# Patient Record
Sex: MALE | Race: BLACK OR AFRICAN AMERICAN | NOT HISPANIC OR LATINO | Employment: UNEMPLOYED | URBAN - METROPOLITAN AREA
[De-identification: names, ages, dates, MRNs, and addresses within clinical notes are randomized per-mention and may not be internally consistent; named-entity substitution may affect disease eponyms.]

---

## 2020-03-03 ENCOUNTER — HOSPITAL ENCOUNTER (EMERGENCY)
Facility: HOSPITAL | Age: 25
Discharge: HOME | End: 2020-03-03
Attending: EMERGENCY MEDICINE | Admitting: EMERGENCY MEDICINE

## 2020-03-03 VITALS
SYSTOLIC BLOOD PRESSURE: 142 MMHG | BODY MASS INDEX: 30.61 KG/M2 | HEIGHT: 67 IN | HEART RATE: 91 BPM | DIASTOLIC BLOOD PRESSURE: 86 MMHG | TEMPERATURE: 97.7 F | WEIGHT: 195 LBS | OXYGEN SATURATION: 98 % | RESPIRATION RATE: 16 BRPM

## 2020-03-03 DIAGNOSIS — T78.1XXA ALLERGIC REACTION TO FOOD, INITIAL ENCOUNTER: Primary | ICD-10-CM

## 2020-03-03 PROCEDURE — 96374 THER/PROPH/DIAG INJ IV PUSH: CPT

## 2020-03-03 PROCEDURE — 25010000002 DIPHENHYDRAMINE PER 50 MG: Performed by: EMERGENCY MEDICINE

## 2020-03-03 PROCEDURE — 96375 TX/PRO/DX INJ NEW DRUG ADDON: CPT

## 2020-03-03 PROCEDURE — 99283 EMERGENCY DEPT VISIT LOW MDM: CPT

## 2020-03-03 PROCEDURE — 25010000002 METHYLPREDNISOLONE PER 125 MG: Performed by: EMERGENCY MEDICINE

## 2020-03-03 PROCEDURE — 25010000002 EPINEPHRINE PER 0.1 MG: Performed by: EMERGENCY MEDICINE

## 2020-03-03 PROCEDURE — 96372 THER/PROPH/DIAG INJ SC/IM: CPT

## 2020-03-03 RX ORDER — PREDNISONE 20 MG/1
60 TABLET ORAL DAILY
Qty: 9 TABLET | Refills: 0 | Status: SHIPPED | OUTPATIENT
Start: 2020-03-03

## 2020-03-03 RX ORDER — FAMOTIDINE 20 MG/1
20 TABLET, FILM COATED ORAL 2 TIMES DAILY
Qty: 10 TABLET | Refills: 0 | Status: SHIPPED | OUTPATIENT
Start: 2020-03-03

## 2020-03-03 RX ORDER — METHYLPREDNISOLONE SODIUM SUCCINATE 125 MG/2ML
125 INJECTION, POWDER, LYOPHILIZED, FOR SOLUTION INTRAMUSCULAR; INTRAVENOUS ONCE
Status: COMPLETED | OUTPATIENT
Start: 2020-03-03 | End: 2020-03-03

## 2020-03-03 RX ORDER — DIPHENHYDRAMINE HYDROCHLORIDE 50 MG/ML
12.5 INJECTION INTRAMUSCULAR; INTRAVENOUS ONCE
Status: COMPLETED | OUTPATIENT
Start: 2020-03-03 | End: 2020-03-03

## 2020-03-03 RX ORDER — FAMOTIDINE 10 MG/ML
20 INJECTION, SOLUTION INTRAVENOUS ONCE
Status: COMPLETED | OUTPATIENT
Start: 2020-03-03 | End: 2020-03-03

## 2020-03-03 RX ORDER — EPINEPHRINE 0.3 MG/.3ML
0.3 INJECTION SUBCUTANEOUS ONCE
Qty: 1 EACH | Refills: 0 | Status: SHIPPED | OUTPATIENT
Start: 2020-03-03 | End: 2020-03-03

## 2020-03-03 RX ORDER — SODIUM CHLORIDE 0.9 % (FLUSH) 0.9 %
10 SYRINGE (ML) INJECTION AS NEEDED
Status: DISCONTINUED | OUTPATIENT
Start: 2020-03-03 | End: 2020-03-03 | Stop reason: HOSPADM

## 2020-03-03 RX ADMIN — FAMOTIDINE 20 MG: 10 INJECTION INTRAVENOUS at 01:04

## 2020-03-03 RX ADMIN — DIPHENHYDRAMINE HYDROCHLORIDE 12.5 MG: 50 INJECTION, SOLUTION INTRAMUSCULAR; INTRAVENOUS at 02:56

## 2020-03-03 RX ADMIN — EPINEPHRINE 0.3 MG: 1 INJECTION, SOLUTION, CONCENTRATE INTRAVENOUS at 01:07

## 2020-03-03 RX ADMIN — METHYLPREDNISOLONE SODIUM SUCCINATE 125 MG: 125 INJECTION, POWDER, FOR SOLUTION INTRAMUSCULAR; INTRAVENOUS at 01:04

## 2020-03-03 NOTE — ED PROVIDER NOTES
EMERGENCY DEPARTMENT ENCOUNTER    CHIEF COMPLAINT  Chief Complaint: Facial swelling  History given by: Patient, mother  History limited by: None  Room Number: 19/19  PMD: Nieves Croft MD      HPI:  Pt is a 24 y.o. male who presents complaining of facial swelling around his eyes that occurred about 30 minutes after he ate salmon around 2230 this evening. Patient denies any other swelling, throat swelling, rash, or difficulty swallowing, but he reports associated itching on his head and face. He took 50 mg Benadryl prior to arrival. Per mother, the patient has eaten salmon in the past without any reactions. Per mother, the patient has a hx of allergic reactions as a child and he had to use an Epi-pen often.    Duration/Onset/Timing: around 2300/gradual/constant  Location: face  Radiation: none  Quality: swelling and itching  Intensity/Severity: moderate  Associated Symptoms: none  Aggravating or Alleviating Factors: none  Previous Episodes: yes      PAST MEDICAL HISTORY  Active Ambulatory Problems     Diagnosis Date Noted   • No Active Ambulatory Problems     Resolved Ambulatory Problems     Diagnosis Date Noted   • No Resolved Ambulatory Problems     No Additional Past Medical History       PAST SURGICAL HISTORY  No past surgical history on file.    FAMILY HISTORY  No family history on file.    SOCIAL HISTORY  Social History     Socioeconomic History   • Marital status: Single     Spouse name: Not on file   • Number of children: Not on file   • Years of education: Not on file   • Highest education level: Not on file       ALLERGIES  Fish-derived products and Olive    REVIEW OF SYSTEMS  Review of Systems   Constitutional: Negative for activity change, appetite change and fever.   HENT: Positive for facial swelling. Negative for congestion and sore throat.    Eyes: Negative.    Respiratory: Negative for cough and shortness of breath.    Cardiovascular: Negative for chest pain and leg swelling.   Gastrointestinal:  Negative for abdominal pain, diarrhea and vomiting.   Endocrine: Negative.    Genitourinary: Negative for decreased urine volume and dysuria.   Musculoskeletal: Negative for neck pain.   Skin: Negative for rash and wound.   Allergic/Immunologic: Negative.    Neurological: Negative for weakness, numbness and headaches.   Hematological: Negative.    Psychiatric/Behavioral: Negative.    All other systems reviewed and are negative.      PHYSICAL EXAM  ED Triage Vitals [03/03/20 0041]   Temp Heart Rate Resp BP SpO2   97.7 °F (36.5 °C) 102 19 -- 98 %      Temp src Heart Rate Source Patient Position BP Location FiO2 (%)   -- -- -- -- --       Physical Exam   Constitutional: He is oriented to person, place, and time.   HENT:   Head: Normocephalic and atraumatic.   Mouth/Throat: No posterior oropharyngeal edema.   Mild periorbital swelling. No swelling at the lips, tongue, or oropharynx.   Eyes: Pupils are equal, round, and reactive to light. EOM are normal.   Neck: Normal range of motion. Neck supple.   Cardiovascular: Normal rate, regular rhythm and normal heart sounds. Exam reveals no gallop and no friction rub.   No murmur heard.  Pulmonary/Chest: Effort normal and breath sounds normal. No respiratory distress. He has no wheezes. He has no rhonchi. He has no rales.   Abdominal: Soft. There is no tenderness. There is no rebound and no guarding.   Musculoskeletal: Normal range of motion. He exhibits no edema.   Neurological: He is alert and oriented to person, place, and time. He has normal sensation and normal strength.   Skin: Skin is warm and dry. No rash noted.   Psychiatric: Mood and affect normal.   Nursing note and vitals reviewed.      LAB RESULTS  Lab Results (last 24 hours)     ** No results found for the last 24 hours. **          I ordered the above labs and reviewed the results    RADIOLOGY  No orders to display        PROCEDURES  Critical Care  Performed by: Jose Alejandro Saleh MD  Authorized by: Delfino  Jose Alejandro ARGUETA MD     Critical care provider statement:     Critical care time (minutes):  30    Critical care time was exclusive of:  Separately billable procedures and treating other patients    Critical care was necessary to treat or prevent imminent or life-threatening deterioration of the following conditions: Acute allergic reaction.    Critical care was time spent personally by me on the following activities:  Development of treatment plan with patient or surrogate, pulse oximetry, re-evaluation of patient's condition, evaluation of patient's response to treatment and examination of patient    I assumed direction of critical care for this patient from another provider in my specialty: no            PROGRESS AND CONSULTS  ED Course as of Mar 03 0328   Tue Mar 03, 2020   0245 Patient is resting comfortably.  Vital signs are normal.  Lungs are clear bilaterally.  Patient still reports some mild itching of his face and scalp.  Periorbital swelling has improved but has not completely resolved.  Patient will be given a dose of IV Benadryl prior to discharge.  Return precautions were discussed with the patient and his family.  He will be discharged with prescriptions for Pepcid and prednisone.    [WH]      ED Course User Index  [WH] Jose Alejandro Saleh MD     0145: Patient symptoms are improving.  He denies shortness of breath or difficulty swallowing.      MEDICAL DECISION MAKING  Results were reviewed/discussed with the patient and they were also made aware of online access. Pt also made aware that some labs, such as cultures, will not be resulted during ER visit and follow up with PMD is necessary.     MDM  Number of Diagnoses or Management Options  Allergic reaction to food, initial encounter:   Diagnosis management comments: Patient presented to the ER with some facial swelling after eating salmon.  He was given Benadryl, Pepcid, Solu-Medrol, and epinephrine.  His symptoms improved.  He was breathing comfortably  and was able to swallow without difficulty.  He was observed in the ER for several hours.  Patient was advised to follow-up for allergy testing.  He will be discharged with prescriptions for prednisone, Pepcid, and an EpiPen.  Critical care was performed on this patient.       Amount and/or Complexity of Data Reviewed  Clinical lab tests: ordered and reviewed  Decide to obtain previous medical records or to obtain history from someone other than the patient: yes  Obtain history from someone other than the patient: yes (Mother)  Review and summarize past medical records: yes    Patient Progress  Patient progress: stable         DIAGNOSIS  Final diagnoses:   Allergic reaction to food, initial encounter       DISPOSITION  Discharge    Latest Documented Vital Signs:  As of 3:28 AM  BP- 139/86 HR- 79 Temp- 97.7 °F (36.5 °C) O2 sat- 97%    --  Documentation assistance provided by ntaalia Jaime for Dr. Delfino MD.  Information recorded by the scribe was done at my direction and has been verified and validated by me.       Kylah Jaime  03/03/20 0102       Jose Alejandro Saleh MD  03/03/20 0328

## 2020-03-03 NOTE — ED TRIAGE NOTES
Pt to ED for c/o facial swelling, SOA x 1 hour.  Pt reports eating salmon tonight, and immediately had facial swelling.  Pt was given 50mg benadryl PTA by mother.  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

## 2020-03-03 NOTE — ED NOTES
Pt reports he feels like his eyes are less swollen but states he continues to feel itchy though it has improved     Nery Heredia RN  03/03/20 4365

## 2020-03-03 NOTE — DISCHARGE INSTRUCTIONS
Take medications as prescribed.  Take Benadryl every 6 hours for the next 2 to 3 days.  You need to follow-up with an allergist for allergy testing.  Return to the emergency department for worsening symptoms, difficulty swallowing, shortness of breath, fainting, dizziness, or other concern.  Avoid eating seafood until you can have allergy testing performed.

## 2020-03-03 NOTE — ED NOTES
Pt reports he was eating dinner and about 30 mins later his eyes began to swell and itch. Pt has swelling to face.  Pt denies SOA and appears in NAD. No oral swelling noted at this time. Speech sounds normal per mom.  Pt had 50 mg benadryl at home 1 Hr PTA and reports improvement since     Nery Heredia RN  03/03/20 0057